# Patient Record
Sex: FEMALE | Race: WHITE | NOT HISPANIC OR LATINO | Employment: STUDENT | URBAN - METROPOLITAN AREA
[De-identification: names, ages, dates, MRNs, and addresses within clinical notes are randomized per-mention and may not be internally consistent; named-entity substitution may affect disease eponyms.]

---

## 2024-07-22 RX ORDER — IBUPROFEN 200 MG
400 TABLET ORAL EVERY 6 HOURS PRN
COMMUNITY

## 2024-07-22 NOTE — PROGRESS NOTES
PTA medications updated by Medication Scribe prior to surgery via phone call with patient (last doses completed by Nurse)     Medication history sources: Patient's family/friend (Mother Katarzyna) and H&P  In the past week, patient estimated taking medication this percent of the time: Greater than 90%      Significant changes made to the medication list:  None      Additional medication history information:   None    Medication reconciliation completed by provider prior to medication history? No    Time spent in this activity: 15 minutes    The information provided in this note is only as accurate as the sources available at the time of update(s)      Prior to Admission medications    Medication Sig Last Dose Taking? Auth Provider Long Term End Date   ibuprofen (ADVIL/MOTRIN) 200 MG tablet Take 400 mg by mouth every 6 hours as needed for pain ([id number])  at PRN Yes Reported, Patient         Medication history completed by: Gamal Raines

## 2024-07-25 ENCOUNTER — HOSPITAL ENCOUNTER (INPATIENT)
Facility: CLINIC | Age: 24
LOS: 1 days | Discharge: HOME | End: 2024-07-26
Attending: DENTIST | Admitting: DENTIST
Payer: COMMERCIAL

## 2024-07-25 ENCOUNTER — ANESTHESIA (OUTPATIENT)
Dept: SURGERY | Facility: CLINIC | Age: 24
End: 2024-07-25
Payer: COMMERCIAL

## 2024-07-25 ENCOUNTER — ANESTHESIA EVENT (OUTPATIENT)
Dept: SURGERY | Facility: CLINIC | Age: 24
End: 2024-07-25
Payer: COMMERCIAL

## 2024-07-25 PROBLEM — M26.04 MANDIBULAR HYPOPLASIA: Status: ACTIVE | Noted: 2024-07-25

## 2024-07-25 LAB — HCG UR QL: NEGATIVE

## 2024-07-25 PROCEDURE — 250N000011 HC RX IP 250 OP 636

## 2024-07-25 PROCEDURE — C1713 ANCHOR/SCREW BN/BN,TIS/BN: HCPCS | Performed by: DENTIST

## 2024-07-25 PROCEDURE — 120N000001 HC R&B MED SURG/OB

## 2024-07-25 PROCEDURE — 250N000013 HC RX MED GY IP 250 OP 250 PS 637: Performed by: DENTIST

## 2024-07-25 PROCEDURE — 81025 URINE PREGNANCY TEST: CPT | Performed by: ANESTHESIOLOGY

## 2024-07-25 PROCEDURE — 258N000003 HC RX IP 258 OP 636

## 2024-07-25 PROCEDURE — 360N000077 HC SURGERY LEVEL 4, PER MIN: Performed by: DENTIST

## 2024-07-25 PROCEDURE — 272N000002 HC OR SUPPLY OTHER OPNP: Performed by: DENTIST

## 2024-07-25 PROCEDURE — 250N000009 HC RX 250

## 2024-07-25 PROCEDURE — 370N000017 HC ANESTHESIA TECHNICAL FEE, PER MIN: Performed by: DENTIST

## 2024-07-25 PROCEDURE — 250N000009 HC RX 250: Performed by: NURSE ANESTHETIST, CERTIFIED REGISTERED

## 2024-07-25 PROCEDURE — 250N000009 HC RX 250: Performed by: DENTIST

## 2024-07-25 PROCEDURE — 250N000011 HC RX IP 250 OP 636: Performed by: NURSE ANESTHETIST, CERTIFIED REGISTERED

## 2024-07-25 PROCEDURE — 999N000141 HC STATISTIC PRE-PROCEDURE NURSING ASSESSMENT: Performed by: DENTIST

## 2024-07-25 PROCEDURE — 21195 RECONST LWR JAW W/O FIXATION: CPT | Performed by: NURSE ANESTHETIST, CERTIFIED REGISTERED

## 2024-07-25 PROCEDURE — 250N000025 HC SEVOFLURANE, PER MIN: Performed by: DENTIST

## 2024-07-25 PROCEDURE — 250N000011 HC RX IP 250 OP 636: Performed by: ANESTHESIOLOGY

## 2024-07-25 PROCEDURE — 0NSV04Z REPOSITION LEFT MANDIBLE WITH INTERNAL FIXATION DEVICE, OPEN APPROACH: ICD-10-PCS | Performed by: DENTIST

## 2024-07-25 PROCEDURE — 272N000001 HC OR GENERAL SUPPLY STERILE: Performed by: DENTIST

## 2024-07-25 PROCEDURE — 258N000003 HC RX IP 258 OP 636: Performed by: DENTIST

## 2024-07-25 PROCEDURE — 258N000003 HC RX IP 258 OP 636: Performed by: ANESTHESIOLOGY

## 2024-07-25 PROCEDURE — 710N000009 HC RECOVERY PHASE 1, LEVEL 1, PER MIN: Performed by: DENTIST

## 2024-07-25 PROCEDURE — 21195 RECONST LWR JAW W/O FIXATION: CPT | Performed by: ANESTHESIOLOGY

## 2024-07-25 PROCEDURE — 0NST04Z REPOSITION RIGHT MANDIBLE WITH INTERNAL FIXATION DEVICE, OPEN APPROACH: ICD-10-PCS | Performed by: DENTIST

## 2024-07-25 PROCEDURE — 250N000011 HC RX IP 250 OP 636: Performed by: DENTIST

## 2024-07-25 DEVICE — WIRE SURGICAL STEEL 26GA 0 DS-26: Type: IMPLANTABLE DEVICE | Status: FUNCTIONAL

## 2024-07-25 DEVICE — IMPLANTABLE DEVICE: Type: IMPLANTABLE DEVICE | Site: MANDIBLE | Status: FUNCTIONAL

## 2024-07-25 DEVICE — WIRE SURGICAL STEEL 24GA 2 DS-24: Type: IMPLANTABLE DEVICE | Status: FUNCTIONAL

## 2024-07-25 DEVICE — IMP SCR SYN MATRIX 1.85X6MM SELF TAP 04.511.206.01: Type: IMPLANTABLE DEVICE | Site: MANDIBLE | Status: FUNCTIONAL

## 2024-07-25 RX ORDER — AMPICILLIN AND SULBACTAM 1; .5 G/1; G/1
1.5 INJECTION, POWDER, FOR SOLUTION INTRAMUSCULAR; INTRAVENOUS EVERY 6 HOURS
Status: COMPLETED | OUTPATIENT
Start: 2024-07-25 | End: 2024-07-26

## 2024-07-25 RX ORDER — ONDANSETRON 2 MG/ML
4 INJECTION INTRAMUSCULAR; INTRAVENOUS EVERY 30 MIN PRN
Status: DISCONTINUED | OUTPATIENT
Start: 2024-07-25 | End: 2024-07-25 | Stop reason: HOSPADM

## 2024-07-25 RX ORDER — METHYLPREDNISOLONE SODIUM SUCCINATE 125 MG/2ML
125 INJECTION, POWDER, LYOPHILIZED, FOR SOLUTION INTRAMUSCULAR; INTRAVENOUS EVERY 4 HOURS
Status: COMPLETED | OUTPATIENT
Start: 2024-07-25 | End: 2024-07-26

## 2024-07-25 RX ORDER — OXYMETAZOLINE HYDROCHLORIDE 0.05 G/100ML
2 SPRAY NASAL 2 TIMES DAILY PRN
Status: DISCONTINUED | OUTPATIENT
Start: 2024-07-25 | End: 2024-07-26 | Stop reason: HOSPADM

## 2024-07-25 RX ORDER — FENTANYL CITRATE 50 UG/ML
50 INJECTION, SOLUTION INTRAMUSCULAR; INTRAVENOUS EVERY 5 MIN PRN
Status: DISCONTINUED | OUTPATIENT
Start: 2024-07-25 | End: 2024-07-25 | Stop reason: HOSPADM

## 2024-07-25 RX ORDER — AMOXICILLIN 250 MG
1 CAPSULE ORAL 2 TIMES DAILY
Status: DISCONTINUED | OUTPATIENT
Start: 2024-07-25 | End: 2024-07-26 | Stop reason: HOSPADM

## 2024-07-25 RX ORDER — LIDOCAINE HYDROCHLORIDE 20 MG/ML
INJECTION, SOLUTION INFILTRATION; PERINEURAL PRN
Status: DISCONTINUED | OUTPATIENT
Start: 2024-07-25 | End: 2024-07-25

## 2024-07-25 RX ORDER — ONDANSETRON 2 MG/ML
4 INJECTION INTRAMUSCULAR; INTRAVENOUS EVERY 6 HOURS PRN
Status: DISCONTINUED | OUTPATIENT
Start: 2024-07-25 | End: 2024-07-26 | Stop reason: HOSPADM

## 2024-07-25 RX ORDER — NALOXONE HYDROCHLORIDE 0.4 MG/ML
0.1 INJECTION, SOLUTION INTRAMUSCULAR; INTRAVENOUS; SUBCUTANEOUS
Status: DISCONTINUED | OUTPATIENT
Start: 2024-07-25 | End: 2024-07-25 | Stop reason: HOSPADM

## 2024-07-25 RX ORDER — CHLORHEXIDINE GLUCONATE ORAL RINSE 1.2 MG/ML
10 SOLUTION DENTAL ONCE
Status: COMPLETED | OUTPATIENT
Start: 2024-07-25 | End: 2024-07-25

## 2024-07-25 RX ORDER — DIPHENHYDRAMINE HYDROCHLORIDE 50 MG/ML
25 INJECTION INTRAMUSCULAR; INTRAVENOUS EVERY 6 HOURS PRN
Status: DISCONTINUED | OUTPATIENT
Start: 2024-07-25 | End: 2024-07-26 | Stop reason: HOSPADM

## 2024-07-25 RX ORDER — SODIUM CHLORIDE, SODIUM LACTATE, POTASSIUM CHLORIDE, CALCIUM CHLORIDE 600; 310; 30; 20 MG/100ML; MG/100ML; MG/100ML; MG/100ML
INJECTION, SOLUTION INTRAVENOUS CONTINUOUS
Status: DISCONTINUED | OUTPATIENT
Start: 2024-07-25 | End: 2024-07-26 | Stop reason: HOSPADM

## 2024-07-25 RX ORDER — OXYCODONE HYDROCHLORIDE 5 MG/1
5 TABLET ORAL EVERY 4 HOURS PRN
Status: DISCONTINUED | OUTPATIENT
Start: 2024-07-25 | End: 2024-07-26 | Stop reason: HOSPADM

## 2024-07-25 RX ORDER — DEXAMETHASONE SODIUM PHOSPHATE 4 MG/ML
INJECTION, SOLUTION INTRA-ARTICULAR; INTRALESIONAL; INTRAMUSCULAR; INTRAVENOUS; SOFT TISSUE PRN
Status: DISCONTINUED | OUTPATIENT
Start: 2024-07-25 | End: 2024-07-25

## 2024-07-25 RX ORDER — HYDROMORPHONE HCL IN WATER/PF 6 MG/30 ML
0.4 PATIENT CONTROLLED ANALGESIA SYRINGE INTRAVENOUS EVERY 5 MIN PRN
Status: DISCONTINUED | OUTPATIENT
Start: 2024-07-25 | End: 2024-07-25 | Stop reason: HOSPADM

## 2024-07-25 RX ORDER — HYDROMORPHONE HCL IN WATER/PF 6 MG/30 ML
0.2 PATIENT CONTROLLED ANALGESIA SYRINGE INTRAVENOUS
Status: DISCONTINUED | OUTPATIENT
Start: 2024-07-25 | End: 2024-07-26 | Stop reason: HOSPADM

## 2024-07-25 RX ORDER — FENTANYL CITRATE 50 UG/ML
25 INJECTION, SOLUTION INTRAMUSCULAR; INTRAVENOUS EVERY 5 MIN PRN
Status: DISCONTINUED | OUTPATIENT
Start: 2024-07-25 | End: 2024-07-25 | Stop reason: HOSPADM

## 2024-07-25 RX ORDER — NALOXONE HYDROCHLORIDE 0.4 MG/ML
0.2 INJECTION, SOLUTION INTRAMUSCULAR; INTRAVENOUS; SUBCUTANEOUS
Status: DISCONTINUED | OUTPATIENT
Start: 2024-07-25 | End: 2024-07-26 | Stop reason: HOSPADM

## 2024-07-25 RX ORDER — CHLORHEXIDINE GLUCONATE ORAL RINSE 1.2 MG/ML
SOLUTION DENTAL PRN
Status: DISCONTINUED | OUTPATIENT
Start: 2024-07-25 | End: 2024-07-25 | Stop reason: HOSPADM

## 2024-07-25 RX ORDER — METHYLPREDNISOLONE SODIUM SUCCINATE 125 MG/2ML
125 INJECTION, POWDER, LYOPHILIZED, FOR SOLUTION INTRAMUSCULAR; INTRAVENOUS ONCE
Status: DISCONTINUED | OUTPATIENT
Start: 2024-07-25 | End: 2024-07-25 | Stop reason: HOSPADM

## 2024-07-25 RX ORDER — KETOROLAC TROMETHAMINE 15 MG/ML
15 INJECTION, SOLUTION INTRAMUSCULAR; INTRAVENOUS EVERY 6 HOURS
Status: COMPLETED | OUTPATIENT
Start: 2024-07-25 | End: 2024-07-26

## 2024-07-25 RX ORDER — BENZOCAINE/MENTHOL 6 MG-10 MG
LOZENGE MUCOUS MEMBRANE PRN
Status: DISCONTINUED | OUTPATIENT
Start: 2024-07-25 | End: 2024-07-25 | Stop reason: HOSPADM

## 2024-07-25 RX ORDER — ONDANSETRON 4 MG/1
4 TABLET, ORALLY DISINTEGRATING ORAL EVERY 6 HOURS PRN
Status: DISCONTINUED | OUTPATIENT
Start: 2024-07-25 | End: 2024-07-26 | Stop reason: HOSPADM

## 2024-07-25 RX ORDER — BISACODYL 10 MG
10 SUPPOSITORY, RECTAL RECTAL DAILY PRN
Status: DISCONTINUED | OUTPATIENT
Start: 2024-07-28 | End: 2024-07-26 | Stop reason: HOSPADM

## 2024-07-25 RX ORDER — PETROLATUM,WHITE
OINTMENT IN PACKET (GRAM) TOPICAL
Status: DISCONTINUED | OUTPATIENT
Start: 2024-07-25 | End: 2024-07-26 | Stop reason: HOSPADM

## 2024-07-25 RX ORDER — CEFAZOLIN SODIUM/WATER 2 G/20 ML
2 SYRINGE (ML) INTRAVENOUS
Status: DISCONTINUED | OUTPATIENT
Start: 2024-07-25 | End: 2024-07-25 | Stop reason: HOSPADM

## 2024-07-25 RX ORDER — CHLORHEXIDINE GLUCONATE ORAL RINSE 1.2 MG/ML
15 SOLUTION DENTAL 2 TIMES DAILY
Status: DISCONTINUED | OUTPATIENT
Start: 2024-07-25 | End: 2024-07-26 | Stop reason: HOSPADM

## 2024-07-25 RX ORDER — CEFAZOLIN SODIUM/WATER 2 G/20 ML
2 SYRINGE (ML) INTRAVENOUS SEE ADMIN INSTRUCTIONS
Status: DISCONTINUED | OUTPATIENT
Start: 2024-07-25 | End: 2024-07-25 | Stop reason: HOSPADM

## 2024-07-25 RX ORDER — ONDANSETRON 2 MG/ML
INJECTION INTRAMUSCULAR; INTRAVENOUS PRN
Status: DISCONTINUED | OUTPATIENT
Start: 2024-07-25 | End: 2024-07-25

## 2024-07-25 RX ORDER — PROPOFOL 10 MG/ML
INJECTION, EMULSION INTRAVENOUS CONTINUOUS PRN
Status: DISCONTINUED | OUTPATIENT
Start: 2024-07-25 | End: 2024-07-25

## 2024-07-25 RX ORDER — ACETAMINOPHEN 325 MG/1
975 TABLET ORAL EVERY 8 HOURS
Status: DISCONTINUED | OUTPATIENT
Start: 2024-07-25 | End: 2024-07-26 | Stop reason: HOSPADM

## 2024-07-25 RX ORDER — DIPHENHYDRAMINE HCL 25 MG
25 CAPSULE ORAL EVERY 6 HOURS PRN
Status: DISCONTINUED | OUTPATIENT
Start: 2024-07-25 | End: 2024-07-26 | Stop reason: HOSPADM

## 2024-07-25 RX ORDER — ACETAMINOPHEN 325 MG/1
650 TABLET ORAL EVERY 4 HOURS PRN
Status: DISCONTINUED | OUTPATIENT
Start: 2024-07-28 | End: 2024-07-26 | Stop reason: HOSPADM

## 2024-07-25 RX ORDER — PROCHLORPERAZINE MALEATE 10 MG
10 TABLET ORAL EVERY 6 HOURS PRN
Status: DISCONTINUED | OUTPATIENT
Start: 2024-07-25 | End: 2024-07-26 | Stop reason: HOSPADM

## 2024-07-25 RX ORDER — HYDROMORPHONE HCL IN WATER/PF 6 MG/30 ML
0.2 PATIENT CONTROLLED ANALGESIA SYRINGE INTRAVENOUS EVERY 5 MIN PRN
Status: DISCONTINUED | OUTPATIENT
Start: 2024-07-25 | End: 2024-07-25 | Stop reason: HOSPADM

## 2024-07-25 RX ORDER — HYDROMORPHONE HCL IN WATER/PF 6 MG/30 ML
0.4 PATIENT CONTROLLED ANALGESIA SYRINGE INTRAVENOUS
Status: DISCONTINUED | OUTPATIENT
Start: 2024-07-25 | End: 2024-07-26 | Stop reason: HOSPADM

## 2024-07-25 RX ORDER — CEFAZOLIN SODIUM/WATER 2 G/20 ML
SYRINGE (ML) INTRAVENOUS PRN
Status: DISCONTINUED | OUTPATIENT
Start: 2024-07-25 | End: 2024-07-25

## 2024-07-25 RX ORDER — BENZOCAINE/MENTHOL 6 MG-10 MG
LOZENGE MUCOUS MEMBRANE 3 TIMES DAILY
Status: DISCONTINUED | OUTPATIENT
Start: 2024-07-25 | End: 2024-07-26 | Stop reason: HOSPADM

## 2024-07-25 RX ORDER — LIDOCAINE 40 MG/G
CREAM TOPICAL
Status: DISCONTINUED | OUTPATIENT
Start: 2024-07-25 | End: 2024-07-26 | Stop reason: HOSPADM

## 2024-07-25 RX ORDER — OXYCODONE HYDROCHLORIDE 5 MG/1
10 TABLET ORAL EVERY 4 HOURS PRN
Status: DISCONTINUED | OUTPATIENT
Start: 2024-07-25 | End: 2024-07-26 | Stop reason: HOSPADM

## 2024-07-25 RX ORDER — POLYETHYLENE GLYCOL 3350 17 G/17G
17 POWDER, FOR SOLUTION ORAL DAILY
Status: DISCONTINUED | OUTPATIENT
Start: 2024-07-26 | End: 2024-07-26 | Stop reason: HOSPADM

## 2024-07-25 RX ORDER — SODIUM CHLORIDE, SODIUM LACTATE, POTASSIUM CHLORIDE, CALCIUM CHLORIDE 600; 310; 30; 20 MG/100ML; MG/100ML; MG/100ML; MG/100ML
INJECTION, SOLUTION INTRAVENOUS CONTINUOUS
Status: DISCONTINUED | OUTPATIENT
Start: 2024-07-25 | End: 2024-07-25 | Stop reason: HOSPADM

## 2024-07-25 RX ORDER — ONDANSETRON 4 MG/1
4 TABLET, ORALLY DISINTEGRATING ORAL EVERY 30 MIN PRN
Status: DISCONTINUED | OUTPATIENT
Start: 2024-07-25 | End: 2024-07-25 | Stop reason: HOSPADM

## 2024-07-25 RX ORDER — FENTANYL CITRATE 50 UG/ML
INJECTION, SOLUTION INTRAMUSCULAR; INTRAVENOUS PRN
Status: DISCONTINUED | OUTPATIENT
Start: 2024-07-25 | End: 2024-07-25

## 2024-07-25 RX ORDER — PROPOFOL 10 MG/ML
INJECTION, EMULSION INTRAVENOUS PRN
Status: DISCONTINUED | OUTPATIENT
Start: 2024-07-25 | End: 2024-07-25

## 2024-07-25 RX ORDER — NALOXONE HYDROCHLORIDE 0.4 MG/ML
0.4 INJECTION, SOLUTION INTRAMUSCULAR; INTRAVENOUS; SUBCUTANEOUS
Status: DISCONTINUED | OUTPATIENT
Start: 2024-07-25 | End: 2024-07-26 | Stop reason: HOSPADM

## 2024-07-25 RX ORDER — DEXAMETHASONE SODIUM PHOSPHATE 4 MG/ML
4 INJECTION, SOLUTION INTRA-ARTICULAR; INTRALESIONAL; INTRAMUSCULAR; INTRAVENOUS; SOFT TISSUE
Status: DISCONTINUED | OUTPATIENT
Start: 2024-07-25 | End: 2024-07-25 | Stop reason: HOSPADM

## 2024-07-25 RX ORDER — METHYLPREDNISOLONE SODIUM SUCCINATE 125 MG/2ML
125 INJECTION, POWDER, LYOPHILIZED, FOR SOLUTION INTRAMUSCULAR; INTRAVENOUS EVERY 6 HOURS
Status: COMPLETED | OUTPATIENT
Start: 2024-07-26 | End: 2024-07-26

## 2024-07-25 RX ADMIN — Medication 2 G: at 12:34

## 2024-07-25 RX ADMIN — FENTANYL CITRATE 25 MCG: 50 INJECTION, SOLUTION INTRAMUSCULAR; INTRAVENOUS at 15:59

## 2024-07-25 RX ADMIN — SODIUM CHLORIDE, POTASSIUM CHLORIDE, SODIUM LACTATE AND CALCIUM CHLORIDE: 600; 310; 30; 20 INJECTION, SOLUTION INTRAVENOUS at 13:52

## 2024-07-25 RX ADMIN — ROCURONIUM BROMIDE 50 MG: 50 INJECTION, SOLUTION INTRAVENOUS at 12:39

## 2024-07-25 RX ADMIN — METHYLPREDNISOLONE SODIUM SUCCINATE 125 MG: 125 INJECTION, POWDER, FOR SOLUTION INTRAMUSCULAR; INTRAVENOUS at 18:09

## 2024-07-25 RX ADMIN — PHENYLEPHRINE HYDROCHLORIDE 100 MCG: 10 INJECTION INTRAVENOUS at 14:30

## 2024-07-25 RX ADMIN — CHLORHEXIDINE GLUCONATE 0.12% ORAL RINSE 10 ML: 1.2 LIQUID ORAL at 11:15

## 2024-07-25 RX ADMIN — SUGAMMADEX 200 MG: 100 INJECTION, SOLUTION INTRAVENOUS at 15:06

## 2024-07-25 RX ADMIN — PHENYLEPHRINE HYDROCHLORIDE 100 MCG: 10 INJECTION INTRAVENOUS at 13:57

## 2024-07-25 RX ADMIN — FENTANYL CITRATE 100 MCG: 50 INJECTION INTRAMUSCULAR; INTRAVENOUS at 12:39

## 2024-07-25 RX ADMIN — AMPICILLIN SODIUM AND SULBACTAM SODIUM 1.5 G: 1; .5 INJECTION, POWDER, FOR SOLUTION INTRAMUSCULAR; INTRAVENOUS at 18:14

## 2024-07-25 RX ADMIN — PROPOFOL 30 MCG/KG/MIN: 10 INJECTION, EMULSION INTRAVENOUS at 14:43

## 2024-07-25 RX ADMIN — DEXAMETHASONE SODIUM PHOSPHATE 4 MG: 4 INJECTION, SOLUTION INTRA-ARTICULAR; INTRALESIONAL; INTRAMUSCULAR; INTRAVENOUS; SOFT TISSUE at 12:39

## 2024-07-25 RX ADMIN — SODIUM CHLORIDE, POTASSIUM CHLORIDE, SODIUM LACTATE AND CALCIUM CHLORIDE: 600; 310; 30; 20 INJECTION, SOLUTION INTRAVENOUS at 18:08

## 2024-07-25 RX ADMIN — SODIUM CHLORIDE, POTASSIUM CHLORIDE, SODIUM LACTATE AND CALCIUM CHLORIDE: 600; 310; 30; 20 INJECTION, SOLUTION INTRAVENOUS at 12:32

## 2024-07-25 RX ADMIN — ONDANSETRON 4 MG: 2 INJECTION INTRAMUSCULAR; INTRAVENOUS at 14:53

## 2024-07-25 RX ADMIN — MIDAZOLAM 2 MG: 1 INJECTION INTRAMUSCULAR; INTRAVENOUS at 12:35

## 2024-07-25 RX ADMIN — KETOROLAC TROMETHAMINE 15 MG: 15 INJECTION, SOLUTION INTRAMUSCULAR; INTRAVENOUS at 18:08

## 2024-07-25 RX ADMIN — ROCURONIUM BROMIDE 10 MG: 50 INJECTION, SOLUTION INTRAVENOUS at 13:15

## 2024-07-25 RX ADMIN — LIDOCAINE HYDROCHLORIDE 100 MG: 20 INJECTION, SOLUTION INFILTRATION; PERINEURAL at 12:39

## 2024-07-25 RX ADMIN — HYDROCORTISONE: 1 CREAM TOPICAL at 21:36

## 2024-07-25 RX ADMIN — CHLORHEXIDINE GLUCONATE 0.12% ORAL RINSE 15 ML: 1.2 LIQUID ORAL at 21:33

## 2024-07-25 RX ADMIN — PROPOFOL 30 MCG/KG/MIN: 10 INJECTION, EMULSION INTRAVENOUS at 12:39

## 2024-07-25 RX ADMIN — HYDROCORTISONE: 1 CREAM TOPICAL at 18:20

## 2024-07-25 RX ADMIN — ROCURONIUM BROMIDE 10 MG: 50 INJECTION, SOLUTION INTRAVENOUS at 14:16

## 2024-07-25 RX ADMIN — HYDROMORPHONE HYDROCHLORIDE 0.2 MG: 0.2 INJECTION, SOLUTION INTRAMUSCULAR; INTRAVENOUS; SUBCUTANEOUS at 16:12

## 2024-07-25 RX ADMIN — FENTANYL CITRATE 25 MCG: 50 INJECTION, SOLUTION INTRAMUSCULAR; INTRAVENOUS at 15:51

## 2024-07-25 RX ADMIN — PROPOFOL 200 MG: 10 INJECTION, EMULSION INTRAVENOUS at 12:39

## 2024-07-25 RX ADMIN — METHYLPREDNISOLONE SODIUM SUCCINATE 125 MG: 125 INJECTION, POWDER, FOR SOLUTION INTRAMUSCULAR; INTRAVENOUS at 21:22

## 2024-07-25 RX ADMIN — HYDROMORPHONE HYDROCHLORIDE 0.2 MG: 0.2 INJECTION, SOLUTION INTRAMUSCULAR; INTRAVENOUS; SUBCUTANEOUS at 16:29

## 2024-07-25 ASSESSMENT — ACTIVITIES OF DAILY LIVING (ADL)
ADLS_ACUITY_SCORE: 35
ADLS_ACUITY_SCORE: 22
ADLS_ACUITY_SCORE: 35
ADLS_ACUITY_SCORE: 20
ADLS_ACUITY_SCORE: 35
ADLS_ACUITY_SCORE: 20
ADLS_ACUITY_SCORE: 35
ADLS_ACUITY_SCORE: 33
ADLS_ACUITY_SCORE: 21
ADLS_ACUITY_SCORE: 20
ADLS_ACUITY_SCORE: 20

## 2024-07-25 ASSESSMENT — ENCOUNTER SYMPTOMS: SEIZURES: 0

## 2024-07-25 ASSESSMENT — LIFESTYLE VARIABLES: TOBACCO_USE: 0

## 2024-07-25 NOTE — ANESTHESIA PROCEDURE NOTES
Airway       Patient location during procedure: OR  Staff -        Anesthesiologist:  Javid Montana MD       CRNA: Jossie Lubin APRN CRNA       Other Anesthesia Staff: Marizol Luciano       Performed By: SRNA and anesthesiologist  Consent for Airway        Urgency: elective  Indications and Patient Condition       Indications for airway management: yared-procedural       Induction type:intravenous       Mask difficulty assessment: 1 - vent by mask    Final Airway Details       Final airway type: endotracheal airway       Successful airway: NIM  Endotracheal Airway Details        ETT size (mm): 7.0       Cuffed: yes       Successful intubation technique: video laryngoscopy       VL Blade Size: Mcfadden 3       Grade View of Cords: 1       Measured from: nares       Secured at (cm): 26       Bite block used: None    Post intubation assessment        Placement verified by: capnometry        Number of attempts at approach: 1       Number of other approaches attempted: 0       Secured with: tape       Ease of procedure: easy       Dentition: Intact and Unchanged

## 2024-07-25 NOTE — ANESTHESIA CARE TRANSFER NOTE
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    Anesthesia Type:   General     Note:    Oropharynx: oropharynx clear of all foreign objects  Level of Consciousness: awake  Oxygen Supplementation: face mask    Independent Airway: airway patency satisfactory and stable  Dentition: S/P dental procedure  Vital Signs Stable: post-procedure vital signs reviewed and stable  Report to RN Given: handoff report given  Patient transferred to: PACU    Handoff Report: Identifed the Patient, Identified the Reponsible Provider, Reviewed the pertinent medical history, Discussed the surgical course, Reviewed Intra-OP anesthesia mangement and issues during anesthesia, Set expectations for post-procedure period and Allowed opportunity for questions and acknowledgement of understanding  Vitals:  Vitals Value Taken Time   /70 07/25/24 1522   Temp     Pulse 81 07/25/24 1523   Resp 16 07/25/24 1523   SpO2 99 % 07/25/24 1523   Vitals shown include unfiled device data.    Electronically Signed By: ISAI Black CRNA  July 25, 2024  3:24 PM

## 2024-07-25 NOTE — ANESTHESIA PREPROCEDURE EVALUATION
"Anesthesia Pre-Procedure Evaluation    Patient: Bre Aden   MRN: [id number] : 2000        Procedure : Procedure(s):  BILATERAL SAGITTAL SPLIT OSTEOTOMY          History reviewed. No pertinent past medical history.   History reviewed. No pertinent surgical history.   No Known Allergies   Social History     Tobacco Use    Smoking status: Never    Smokeless tobacco: Never   Substance Use Topics    Alcohol use: Yes      Wt Readings from Last 1 Encounters:   24 59 kg (130 lb)        Anesthesia Evaluation            ROS/MED HX  ENT/Pulmonary:    (-) tobacco use, asthma and sleep apnea   Neurologic:    (-) no seizures and no CVA   Cardiovascular:    (-) hypertension   METS/Exercise Tolerance: >4 METS    Hematologic:       Musculoskeletal:       GI/Hepatic:    (-) GERD and liver disease   Renal/Genitourinary:    (-) renal disease   Endo:    (-) Type II DM and thyroid disease   Psychiatric/Substance Use:       Infectious Disease:       Malignancy:       Other:            Physical Exam    Airway        Mallampati: III   TM distance: > 3 FB   Neck ROM: full   Mouth opening: > 3 cm    Respiratory Devices and Support         Dental  no notable dental history   Comment: Ortho brackets on upper and lower teeth    (+) Completely normal teeth      Cardiovascular          Rhythm and rate: regular and normal     Pulmonary   pulmonary exam normal                OUTSIDE LABS:  CBC: No results found for: \"WBC\", \"HGB\", \"HCT\", \"PLT\"  BMP: No results found for: \"NA\", \"POTASSIUM\", \"CHLORIDE\", \"CO2\", \"BUN\", \"CR\", \"GLC\"  COAGS: No results found for: \"PTT\", \"INR\", \"FIBR\"  POC:   Lab Results   Component Value Date    HCG Negative 2024     HEPATIC: No results found for: \"ALBUMIN\", \"PROTTOTAL\", \"ALT\", \"AST\", \"GGT\", \"ALKPHOS\", \"BILITOTAL\", \"BILIDIRECT\", \"RUBY\"  OTHER: No results found for: \"PH\", \"LACT\", \"A1C\", \"LOLITA\", \"PHOS\", \"MAG\", \"LIPASE\", \"AMYLASE\", \"TSH\", \"T4\", \"T3\", \"CRP\", \"SED\"    Anesthesia Plan    ASA Status:  1 "    NPO Status:  NPO Appropriate    Anesthesia Type: General.     - Airway: ETT   Induction: Intravenous, Propofol.   Maintenance: Balanced.   Techniques and Equipment:     - Airway: Video-Laryngoscope, Nasal GONZÁLEZ       Consents    Anesthesia Plan(s) and associated risks, benefits, and realistic alternatives discussed. Questions answered and patient/representative(s) expressed understanding.     - Discussed:     - Discussed with:  Patient, Parent (Mother and/or Father)            Postoperative Care    Pain management: IV analgesics.   PONV prophylaxis: Ondansetron (or other 5HT-3), Dexamethasone or Solumedrol, Background Propofol Infusion     Comments:               Javid Montana MD    I have reviewed the pertinent notes and labs in the chart from the past 30 days and (re)examined the patient.  Any updates or changes from those notes are reflected in this note.

## 2024-07-25 NOTE — OP NOTE
ORAL AND MAXILLOFACIAL SURGERY OPERATIVE REPORT    Preoperative Diagnosis: Mandibular hypoplasia, class II malocclusion, jaw asymmetry  Postoperative Diagnsis: same  Procedure: Bilateral sagittal split osteotomy    Anesthesia: General, Nasotracheal intubation  Surgeon: Donaldo Luna DDS, Milan Castro DDS  Assistant: Katarzyna SWANN    Specimens: none  Drains: none  Complications: none  Estimated Blood Loss: 100cc, none replaced    Indications: Bre is a 23 year old female who presents for bilateral sagittal split osteotomy for mandibular advancement for correction of class II malocclusion.  Virtual surgical planning completed, preoperative orthodontic treatment completed, and all appropriate pre-surgical consultations completed in preparation for surgery.  The patient and family agree to proceed as planned.    Consent: All risks, including but not limited to pain, bleeding, swelling, infection, damage to adjacent hard and soft tissues, temporary versus permanent numbness to the lip, chin, cheek, tongue, teeth, malocclusion, malunion, nonunion, fibrous union, hardware failure, cosmetic deformity, need for further surgery, and unforeseen complications, as well as the benefits and alternatives of surgery were discussed.  All questions were answered preoperatively.    Procedure: On 7/25/24, the patient was identified and greeted in the preoperative hold area.  Procedure was reviewed and informed consent obtained.  The patient was taken to the operating room by the anesthesia team and laid supine on the table.  All standard ASA monitors were placed.  Smooth IV induction.  Eyes were protected.  Nasotracheal intubation performed, tube secured passively.  Patient prepped and draped in standard fashion.  Time-out called.  Throat pack placed.      Approximately 5cc of 2% lidocaine was infiltrated along the lateral aspect of the mandibular body and angle and along the buccal vestibules in the areas of the  proposed osteotomies, and adequate time allowed for vasoconstriction.      Attention was first placed to the left side of the mandible, where incision was made with a 15 blade through buccal mucosa along the lateral aspect of the ascending ramus, and extended just inferior to the mucogingival junction to the mandibular first molar.  Cautery was utilized to complete incision to bone.  Subperiosteal dissection proceeded with a molt curette to expose the lateral aspect of the mandibular body and angle, as well as the medial aspect of the ascending ramus to the mandibular foramen.  J-stripper was utilized to relieve the pterygomandibular sling from the mandibular body and angle.  Notched retractor was utilized for stripping of the ascending ramus of temporalis fibers, as well as for superior retraction.      Modified channel retractor was inserted for retraction of medial tissues and protection of the inferior alveolar neurovascular bundle.  Barrel bur with saline irrigation was utilized remove bone from the ascending ramus to allow visualization of the lingula and mandibular foramen, position of which was verified with nerve hook.  Ajith bur was utilized with copious irrigation to create the medial osteotomy of the mandible just superior to the lingula and through the medial cortex.      Next, using a reciprocating saw with copious irrigation, sagittal osteotomy was made from the anterior extent of the medial osteotomy along the external oblique ridge, extending anteriorly to the first molar region.  Osteotomy was completed through cortical bone.      Finally, vertical osteotomy was made with a Ajith bur with saline irrigation from the anterior portion of the sagittal osteotomy to and including the inferior border.  Channel retractor was utilized for visualization and retraction of the soft tissues at the inferior border of the mandible.      A series of curved and straight osteotomes was utilized to confirm  the osteotomies and split the mandible.  Calderon inferior border separator was inserted at the inferior extent of the vertical osteotomy and worked posteriorly to complete the osteotomy into proximal and distal segments.  The right inferior alveolar neurovascular bundle was noted to be intact within the distal segment.  There was no visible trauma to the neurovascular bundle, which was protected throughout the remainder of the procedure.  The left inferior alveolar neurovascular bundle was noted to be severed with accessible distal and proximal edges.  Neurorraphy was performed with 6-0 prolene suture with close approximation of nerve sheath edges. J-stripper was utilized to strip pterygoid fibers from the inferior aspect of the distal segment, and free movement of the distal segment was noted.  Potential bony interferences were removed with a barrel bur with saline irrigation.      Attention was next placed to the right side, where osteotomy was carried out in identical fashion.     Surgical splint was inserted and 24 and 26 gauge wires were utilized to place the dentition into maxillomandibular fixation in the final occlusion.  Bony interferences were removed from the proximal and distal segments while the inferior alveolar neurovascular bundle was protected.  The left proximal segment was noted to fit passively on the distal segment, and the condyle was noted to be seated in the glenoid fossa.  Fixation was applied with a Synthes six-hole plate with six monocortical screws.  Lag screw was placed in the ascending ramus using a mini- with guide and copious saline irrigation.  The right proximal segment was similarly seated and noted to fit passively, and fixated in identical fashion.  Maxillomandibular fixation was released and mandible was noted to freely swing into the planned postoperative occlusion.  Sites were irrigated copiously with saline and closed with 3-0 chromic gut sutures.      The care of the  patient was handed off to the anesthesia team.  The patient emerged from anesthesia, was extubated, and taken to the recovery room in stable condition.

## 2024-07-25 NOTE — ANESTHESIA POSTPROCEDURE EVALUATION
Patient: Bre Aden    Procedure: Procedure(s):  BILATERAL SAGITTAL SPLIT OSTEOTOMY       Anesthesia Type:  General    Note:  Disposition: Inpatient   Postop Pain Control: Uneventful            Sign Out: Well controlled pain   PONV: No   Neuro/Psych: Uneventful            Sign Out: Acceptable/Baseline neuro status   Airway/Respiratory: Uneventful            Sign Out: Acceptable/Baseline resp. status   CV/Hemodynamics: Uneventful            Sign Out: Acceptable CV status   Other NRE: NONE   DID A NON-ROUTINE EVENT OCCUR? No           Last vitals:  Vitals Value Taken Time   /73 07/25/24 1600   Temp 36.3  C (97.4  F) 07/25/24 1530   Pulse 61 07/25/24 1605   Resp 8 07/25/24 1605   SpO2 99 % 07/25/24 1605   Vitals shown include unfiled device data.    Electronically Signed By: Javid Montana MD  July 25, 2024  4:06 PM

## 2024-07-25 NOTE — BRIEF OP NOTE
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 Item Serial No.  Lot No. LRB No. Used Action   WIRE SURGICAL STEEL 24GA 2 DS-24 - [id number] Wire WIRE SURGICAL STEEL 24GA 2 DS-24  [employer]- 41 04 [id number]  1 Used as a Supply   WIRE SURGICAL STEEL 26GA 0 DS-26 - [id number] Wire WIRE SURGICAL STEEL 26GA 0 DS-26  [employer]- 41 04 [id number]  1 Used as a Supply   IMP SCR SYN MATRIX 1.85X6MM SELF TAP 04.511.206.01 - [id number] Metallic Hardware/[place] IMP SCR SYN MATRIX 1.85X6MM SELF TAP [id number]  [employer] 41 04 [id number] Right 6 Implanted   IMP SCR SYN MATRIX 1.85X6MM SELF TAP 04.511.206.01 - [id number] Metallic Hardware/[place] IMP SCR SYN MATRIX 1.85X6MM SELF TAP [id number]  [employer] 41 04 [id number] Left 6 Implanted   IMP PLATE SYN MATRIX SAGITTAL SPLIT CVD 6H 6MM 04.511.401 - [id number] Metallic Hardware/[place] IMP PLATE SYN MATRIX SAGITTAL SPLIT CVD 6H 6MM 04.511.401  [employer] 41 04 [id number] Left 1 Implanted   IMP PLATE SYN MATRIX SAGITTAL SPLIT CVD 6H 6MM 04.511.401 - [id number] Metallic Hardware/[place] IMP PLATE SYN MATRIX SAGITTAL SPLIT CVD 6H 6MM 04.511.401  [employer] 41 04 [id number] Right 1 Implanted

## 2024-07-26 VITALS
HEART RATE: 75 BPM | DIASTOLIC BLOOD PRESSURE: 63 MMHG | BODY MASS INDEX: 19.7 KG/M2 | TEMPERATURE: 96.8 F | RESPIRATION RATE: 16 BRPM | WEIGHT: 130 LBS | SYSTOLIC BLOOD PRESSURE: 111 MMHG | OXYGEN SATURATION: 97 % | HEIGHT: 68 IN

## 2024-07-26 LAB — GLUCOSE BLDC GLUCOMTR-MCNC: 160 MG/DL (ref 70–99)

## 2024-07-26 PROCEDURE — 250N000011 HC RX IP 250 OP 636: Performed by: DENTIST

## 2024-07-26 PROCEDURE — 250N000013 HC RX MED GY IP 250 OP 250 PS 637: Performed by: DENTIST

## 2024-07-26 PROCEDURE — 258N000003 HC RX IP 258 OP 636: Performed by: DENTIST

## 2024-07-26 RX ADMIN — AMPICILLIN SODIUM AND SULBACTAM SODIUM 1.5 G: 1; .5 INJECTION, POWDER, FOR SOLUTION INTRAMUSCULAR; INTRAVENOUS at 05:25

## 2024-07-26 RX ADMIN — KETOROLAC TROMETHAMINE 15 MG: 15 INJECTION, SOLUTION INTRAMUSCULAR; INTRAVENOUS at 12:59

## 2024-07-26 RX ADMIN — METHYLPREDNISOLONE SODIUM SUCCINATE 125 MG: 125 INJECTION, POWDER, FOR SOLUTION INTRAMUSCULAR; INTRAVENOUS at 05:28

## 2024-07-26 RX ADMIN — AMPICILLIN SODIUM AND SULBACTAM SODIUM 1.5 G: 1; .5 INJECTION, POWDER, FOR SOLUTION INTRAMUSCULAR; INTRAVENOUS at 00:53

## 2024-07-26 RX ADMIN — CHLORHEXIDINE GLUCONATE 0.12% ORAL RINSE 15 ML: 1.2 LIQUID ORAL at 09:24

## 2024-07-26 RX ADMIN — KETOROLAC TROMETHAMINE 15 MG: 15 INJECTION, SOLUTION INTRAMUSCULAR; INTRAVENOUS at 05:57

## 2024-07-26 RX ADMIN — SODIUM CHLORIDE, POTASSIUM CHLORIDE, SODIUM LACTATE AND CALCIUM CHLORIDE: 600; 310; 30; 20 INJECTION, SOLUTION INTRAVENOUS at 08:55

## 2024-07-26 RX ADMIN — METHYLPREDNISOLONE SODIUM SUCCINATE 125 MG: 125 INJECTION, POWDER, FOR SOLUTION INTRAMUSCULAR; INTRAVENOUS at 01:31

## 2024-07-26 RX ADMIN — POLYETHYLENE GLYCOL 3350 17 G: 17 POWDER, FOR SOLUTION ORAL at 09:24

## 2024-07-26 RX ADMIN — ONDANSETRON 4 MG: 2 INJECTION INTRAMUSCULAR; INTRAVENOUS at 06:07

## 2024-07-26 RX ADMIN — PROCHLORPERAZINE EDISYLATE 10 MG: 5 INJECTION INTRAMUSCULAR; INTRAVENOUS at 09:27

## 2024-07-26 RX ADMIN — METHYLPREDNISOLONE SODIUM SUCCINATE 125 MG: 125 INJECTION, POWDER, FOR SOLUTION INTRAMUSCULAR; INTRAVENOUS at 12:59

## 2024-07-26 RX ADMIN — ACETAMINOPHEN 975 MG: 325 TABLET, FILM COATED ORAL at 09:22

## 2024-07-26 RX ADMIN — SENNOSIDES AND DOCUSATE SODIUM 1 TABLET: 50; 8.6 TABLET ORAL at 09:24

## 2024-07-26 RX ADMIN — KETOROLAC TROMETHAMINE 15 MG: 15 INJECTION, SOLUTION INTRAMUSCULAR; INTRAVENOUS at 00:52

## 2024-07-26 RX ADMIN — HYDROCORTISONE: 1 CREAM TOPICAL at 09:29

## 2024-07-26 ASSESSMENT — ACTIVITIES OF DAILY LIVING (ADL)
ADLS_ACUITY_SCORE: 24
ADLS_ACUITY_SCORE: 22
ADLS_ACUITY_SCORE: 24

## 2024-07-26 NOTE — PLAN OF CARE
Goal Outcome Evaluation:      Plan of Care Reviewed With: patient, parent    Overall Patient Progress: improvingOverall Patient Progress: improving         Shift: 0700-discharge  POD#1 BILATERAL SAGITTAL SPLIT OSTEOTOMY     Orientation: AOX4  Vital Signs: VSS, RA  Labs: Glucose 160  Pain Management: Mouth/surgical pain managed well with toradol and tylenol  Bowel: BS hypoactive  Bladder: Voiding adequately in bathroom  IV: LR at 75mL/hr  Wounds/Incisions: SHOAIB  Diet: Clears, jaw  Activity Level: SBA  Significant Information: Jaw bra in place at all times. Good oral intake.      Patient discharged home this afternoon with support from mom and dad.  Discussed AVS with patient. All questions and concerns related to discharge were addressed. All patient belongings and AVS sent home with patient. Instructed patient and her mom to call [facility] clinic if they have any questions regarding discharge instructions.

## 2024-07-26 NOTE — PROGRESS NOTES
Rounding Note    HPI/Subjective:  Post-operative day 1 status-post bilateral sagittal split osteotomy advancement.  Patient denies complaints, and has voided, ambulated and has adequate pain control and oral intake.     Exam:  Bilateral facial swelling consistent with post-op for a BSSO.  No epistaxis, nares are patent.  Bilateral V3 hypoesthesia on the lower right and anesthetic in the lower left.  Midlines coincident.  Overbite/overjet is ideal.  Elastics in place, surgical incisions clean/dry/intact.    Vitals: Reviewed, stable, see EMR.    Assessment: Recovering appropriately status-post BSSO osteotomy and ready for discharge today.    Plan:      -   Discharge to home today.  -   Strict non-chew diet for 6 weeks.  -   Follow-up with [facility] clinic as pre-arranged.    Milan Castro D.D.S.  [facility]

## 2024-07-26 NOTE — CONSULTS
NUTRITION EDUCATION    REASON FOR ASSESSMENT:  Nutrition education on Jaw Surgery Diet    CURRENT DIET:  Clear Liquid Jaw Surgery Diet    NUTRITION DIAGNOSIS:  Food- and nutrition-related knowledge deficit R/t lack of prior exposure to information AEB recent jaw surgery.    INTERVENTIONS:    Nutrition Prescription:  Recommended adequate calories and protein to promote healing, several small meals per day, and use of high protein oral supplements.    Implementation:  Assessed learning needs, learning preferences, and willingness to learn  Nutrition Education  (Content):  Provided handout  What to Eat After Jaw Surgery   Described diet progression per guidelines listed in handout  Discussed importance of small meals  Medical Food Supplements - recommended use of a high protein nutritional supplement  Anticipate good compliance  Diet Education - refer to Education Flowsheet    Goals:  Patient verbalizes understanding of diet   All of the above goals met during the education session    Follow Up:  Provided RD contact information for future questions    Zully Agustin RD, LD  Pager: [phone]

## 2024-07-26 NOTE — DISCHARGE SUMMARY
Discharge Summary    Admission Date: July 25, 2024    Admitting Provider: Donaldo Luna D.D.S.    Discharge Date: July 26, 2024    Discharging Provider:  Milan Castro DDS    Admission Diagnosis: Mandibular hypoplasia    Discharge Diagnosis: Mandibular hypoplasia    Discharge Condition: Good.    Indication for Admission: Patient was admitted for overnight monitoring of airway and pain control after orthognathic surgery.    Hospital Course: Patient underwent a bilateral sagittal split osteotomy yesterday and recovered well overnight, meeting hospital criteria for discharge.    Consults: None.    Significant Diagnostic Studies:    Treatments: Bilateral sagittal split mandibular osteotomy    Discharge Exam: Normal exam from baseline other than anticipated surgical changes.    Disposition:  Home or self care.    Patient Instructions:  Activity: No strenuous exercise or heavy lifting for 1 month.  Diet: Strict non-chew diet for 6 weeks.  Wound Care: Warm salt water rinses after meals.  Chlorhexidine mouth rinse twice daily as directed.    Follow-up as arranged already for next week.  Appointment phone number: [phone]        Milan Castro D.D.S.  [facility]

## 2024-07-26 NOTE — PLAN OF CARE
Goal Outcome Evaluation:      Plan of Care Reviewed With: patient, parent    Overall Patient Progress: improvingOverall Patient Progress: improving    Shift: 7p-7a  Surgery/POD#: POD 1 from a bilat sagittal split osteotomy.  Behavior & Aggression: Green  Fall Risk: Yes  Orientation: A&O x4  ABNL VS/O2: VSS on RA/face tent  Tele: NA  ABNL Labs: NA  Pain Management: scheduled toradol & ice  Bowel/Bladder: voiding in BR, bowel sounds hypoactive, not passing flatus, no BM  Drains: NA  Lines: PIV infusing LR @ 75 ml/hr, int Abx  Diet: clears/jaw  Activity Level: SBA   Tests/Procedures: NA  Anticipated  DC Date: 7/26-7/27, pending oral intake  Significant Information:   [facility] following, nutrition consulted. Minimal PO intake - needed encouragement/reminders to drink, but tolerating. Pt declined oral pills overnight. Jaw bra on & HOB elevated for swelling. Face tent on intermittently per pt request. PCDs on overnight.   Zofran given x1 this AM for some nausea.

## (undated) DEVICE — SPONGE PACK VAGINAL 2X36"

## (undated) DEVICE — BUR STRK 1.1X4.8X63.5MM 2 FLUTE WIRE PASS DC 1608-002-063

## (undated) DEVICE — DECANTER BAG 2002S

## (undated) DEVICE — PACK HEAD NECK SEN15HNFSF

## (undated) DEVICE — SPLINT PATIENT SPECIFIC ORTHO FINAL SD900.106

## (undated) DEVICE — BLADE SAW OSCIL/SAG STRK MICRO 9.0X18.5X0.38MM 2296-003-105

## (undated) DEVICE — BURR CARBIDE MED OVAL 7.7X48MM 5300-010-901

## (undated) DEVICE — SOL NACL 0.9% IRRIG 1000ML BOTTLE 2F7124

## (undated) DEVICE — SU PLAIN FAST ABSORB 5-0 PC-1 18" 1915G

## (undated) DEVICE — Device

## (undated) DEVICE — KIT PATIENT SPECIFIC INSTR/PLAN ORTHO 1 SPLINT SD900.009

## (undated) DEVICE — BURR CARBIDE MED 8 FLUTE ROUND 4X48.5300-010-511

## (undated) DEVICE — BUR STRK ROUND 2.0X54MM POLISHING 18 FLUTE 1608-006-159

## (undated) DEVICE — DRILL BIT SYN MATRIX J LATCH 1.4X6MM STOP 44.5MM 03.511.246

## (undated) DEVICE — LINEN TOWEL PACK X5 5464

## (undated) DEVICE — DECANTER VIAL 2006S

## (undated) DEVICE — GOWN LG DISP 9515

## (undated) DEVICE — SOL WATER IRRIG 1000ML BOTTLE 2F7114

## (undated) DEVICE — ESU GROUND PAD UNIVERSAL W/O CORD

## (undated) DEVICE — SU PROLENE 6-0 P-1 18" 8697G

## (undated) DEVICE — BLADE SAW RECIP STRK SHORT 23X6.0X0.38MM 5100-137-107

## (undated) DEVICE — DRILL BIT STRK LINDEMANN 2.2MM 1608-002-043

## (undated) RX ORDER — OXYMETAZOLINE HYDROCHLORIDE 0.05 G/100ML
SPRAY NASAL
Status: DISPENSED
Start: 2024-07-25

## (undated) RX ORDER — BUPIVACAINE HYDROCHLORIDE 2.5 MG/ML
INJECTION, SOLUTION EPIDURAL; INFILTRATION; INTRACAUDAL
Status: DISPENSED
Start: 2024-07-25

## (undated) RX ORDER — CHLORHEXIDINE GLUCONATE ORAL RINSE 1.2 MG/ML
SOLUTION DENTAL
Status: DISPENSED
Start: 2024-07-25

## (undated) RX ORDER — FENTANYL CITRATE 50 UG/ML
INJECTION, SOLUTION INTRAMUSCULAR; INTRAVENOUS
Status: DISPENSED
Start: 2024-07-25

## (undated) RX ORDER — BENZOCAINE/MENTHOL 6 MG-10 MG
LOZENGE MUCOUS MEMBRANE
Status: DISPENSED
Start: 2024-07-25

## (undated) RX ORDER — ONDANSETRON 2 MG/ML
INJECTION INTRAMUSCULAR; INTRAVENOUS
Status: DISPENSED
Start: 2024-07-25

## (undated) RX ORDER — PROPOFOL 10 MG/ML
INJECTION, EMULSION INTRAVENOUS
Status: DISPENSED
Start: 2024-07-25

## (undated) RX ORDER — HYDROMORPHONE HCL IN WATER/PF 6 MG/30 ML
PATIENT CONTROLLED ANALGESIA SYRINGE INTRAVENOUS
Status: DISPENSED
Start: 2024-07-25

## (undated) RX ORDER — FENTANYL CITRATE 0.05 MG/ML
INJECTION, SOLUTION INTRAMUSCULAR; INTRAVENOUS
Status: DISPENSED
Start: 2024-07-25

## (undated) RX ORDER — DEXAMETHASONE SODIUM PHOSPHATE 4 MG/ML
INJECTION, SOLUTION INTRA-ARTICULAR; INTRALESIONAL; INTRAMUSCULAR; INTRAVENOUS; SOFT TISSUE
Status: DISPENSED
Start: 2024-07-25

## (undated) RX ORDER — LIDOCAINE HYDROCHLORIDE 20 MG/ML
INJECTION, SOLUTION INFILTRATION; PERINEURAL
Status: DISPENSED
Start: 2024-07-25

## (undated) RX ORDER — EPINEPHRINE 1 MG/ML
INJECTION, SOLUTION INTRAMUSCULAR; SUBCUTANEOUS
Status: DISPENSED
Start: 2024-07-25